# Patient Record
Sex: FEMALE | Race: WHITE | NOT HISPANIC OR LATINO | Employment: FULL TIME | URBAN - METROPOLITAN AREA
[De-identification: names, ages, dates, MRNs, and addresses within clinical notes are randomized per-mention and may not be internally consistent; named-entity substitution may affect disease eponyms.]

---

## 2017-05-08 ENCOUNTER — ALLSCRIPTS OFFICE VISIT (OUTPATIENT)
Dept: OTHER | Facility: OTHER | Age: 52
End: 2017-05-08

## 2017-05-08 DIAGNOSIS — H91.90 HEARING LOSS: ICD-10-CM

## 2017-09-25 ENCOUNTER — TRANSCRIBE ORDERS (OUTPATIENT)
Dept: ADMINISTRATIVE | Facility: HOSPITAL | Age: 52
End: 2017-09-25

## 2017-11-09 ENCOUNTER — ALLSCRIPTS OFFICE VISIT (OUTPATIENT)
Dept: OTHER | Facility: OTHER | Age: 52
End: 2017-11-09

## 2017-11-10 NOTE — PROGRESS NOTES
Assessment    1  Viral URI (465 9) (J06 9,B97 89)   2  Hypertension (401 9) (I10)   3  Never a smoker   4  Need for influenza vaccination (V04 81) (Z23)   5  Encounter for screening colonoscopy (V76 51) (Z12 11)    Plan  Allergic rhinitis    · Nasacort Allergy 24HR 55 MCG/ACT Nasal Aerosol; 2 sen daily  Encounter for screening colonoscopy    · *1 -  [facility]  *  Status:Active - Retrospective By Protocol Authorization  Requested for: 24TIK5470  Care Summary provided  : Yes  Need for influenza vaccination    · Fluzone Quadrivalent Intramuscular Suspension; 0 5ml IM; To Be Done:09Nov2017  Sinusitis    · Cefuroxime Axetil 500 MG Oral Tablet  Viral URI    · Follow Up if Not Better Evaluation and Treatment  Follow-up  Status: Complete  Done:09Nov2017 02:29PM    Discussion/Summary    1 : Viral upper respiratory infection: Recommend symptomatic treatment  She can use Nasacort as well  Continue with the current treatments she is using  Follow-up in approximately 2 weeks if needed  Hypertension: Blood pressure today is slightly elevated, however she is ill  No changes at the moment  Re-evaluate in the future  Continue with hydrochlorothiazide  Chief Complaint  c/o annoying persistent cough and PND  Present about 1 week  Using Mucinex DM and otc cough drops  Also used a ProAir inhaler that she had in the house last night  History of Present Illness  HPI: See chief complaint  Cough has become more aggressive to the point of almost vomiting  She does have some nasal congestion, sore throat, and pain in L ear  No fever, chills, myalgias, fatigue  Sick contacts at work  Allergic to Macrobid  Review of Systems   Constitutional: No fever, no chills, feels well, no tiredness, no recent weight gain or loss  ENT: earache,-- sore throat-- and-- nasal discharge, but-- no hearing loss-- and-- no hoarseness    Cardiovascular: no complaints of slow or fast heart rate, no chest pain, no palpitations, no leg claudication or lower extremity edema  Respiratory: cough-- and-- PND, but-- no shortness of breath,-- no wheezing-- and-- no shortness of breath during exertion  Gastrointestinal: no complaints of abdominal pain, no constipation, no nausea or diarrhea, no vomiting, no bloody stools  Genitourinary: no complaints of dysuria, no incontinence, no pelvic pain, no dysmenorrhea, no vaginal discharge or abnormal vaginal bleeding  Musculoskeletal: no complaints of arthralgia, no myalgia, no joint swelling or stiffness, no limb pain or swelling  Integumentary: no complaints of skin rash or lesion, no itching or dry skin, no skin wounds  Neurological: no complaints of headache, no confusion, no numbness or tingling, no dizziness or fainting  ROS reviewed  Active Problems  1  Acute UTI (599 0) (N39 0)   2  Allergic rhinitis (477 9) (J30 9)   3  Back pain (724 5) (M54 9)   4  Breast lump (611 72) (N63)   5  Ceruminosis (380 4) (H61 20)   6  Cough (786 2) (R05)   7  Decreased hearing (389 9) (H91 90)   8  Dermatitis (692 9) (L30 9)   9  Encounter for screening colonoscopy (V76 51) (Z12 11)   10  Fibrocystic disease of breast (610 1) (N60 19)   11  Hearing loss (389 9) (H91 90)   12  Hyperlipidemia (272 4) (E78 5)   13  Hypertension (401 9) (I10)   14  Hypokalemia (276 8) (E87 6)   15  Neoplasm of skin (239 2) (D49 2)   16  Ptosis, both eyelids (374 30) (H02 403)   17  Sinusitis (473 9) (J32 9)   18  Skin cyst (706 2) (L72 9)   19  Urinary frequency (788 41) (R35 0)   20  Visit for routine gyn exam (V72 31) (Z01 419)    Family History  Mother    1  Family history of Lung Cancer (V16 1)   2  Family history of Mother  At Age 66  Father    3  Family history of Coronary Artery Disease (V17 49)   4  Family history of Father  At Age 62   8  Family history of Hypertension (V17 49)   6  Family history of Myocardial Infarction Arrhythmias  Sister    7   Family history of Hypertension (V17 49)   8  Family history of Hypertension (V17 49)   9  Family history of Hypertension (V17 49)  Brother    8  Family history of Hypertension (V17 49)    Social History     · Never a smoker    Current Meds   1  Cefuroxime Axetil 500 MG Oral Tablet; TAKE 1 TABLET EVERY 12 HOURS DAILY; Therapy: 51FWW0077 to (Last YF:86NVI1949) Ordered   2  HydroCHLOROthiazide 12 5 MG Oral Tablet; TAKE 1 TABLET DAILY (OFFICE VISIT NEEDED BEFORE REFILLS); Therapy: 06BID7514 to (Last Rx:02Oct2017)  Requested for: 47Sam5097 Ordered   3  Nasacort Allergy 24HR 55 MCG/ACT Nasal Aerosol; 2 sen daily; Therapy: 20Apr2012 to (Evaluate:59Khq3948)  Requested for: 73UAX9083; Last Rx:48Qbb7572 Ordered    Allergies  1  Macrobid CAPS   2  ACE Inhibitors   3  Lisinopril TABS   4  Sulfa Drugs    Vitals   Recorded: 58YCR2101 01:57PM   Temperature 97 7 F, Tympanic   Heart Rate 60, L Radial   Pulse Quality Regular, L Radial   Systolic 786, LUE, Sitting   Diastolic 80, LUE, Sitting   Height 5 ft 4 5 in   Weight 137 lb 3 2 oz   BMI Calculated 23 19   BSA Calculated 1 68     Results/Data  PHQ-2 Adult Depression Screening 44NVZ0272 02:01PM User, s     Test Name Result Flag Reference   PHQ-2 Adult Depression Score 0       Over the last two weeks, how often have you been bothered by any of the following problems? Little interest or pleasure in doing things: Not at all - 0 Feeling down, depressed, or hopeless: Not at all - 0   PHQ-2 Adult Depression Screening Negative           Signatures   Electronically signed by :  MARIAELENA Enriquez ; Nov 9 2017  2:32PM EST                       (Author)

## 2017-12-18 ENCOUNTER — APPOINTMENT (OUTPATIENT)
Dept: AUDIOLOGY | Age: 52
End: 2017-12-18
Payer: COMMERCIAL

## 2017-12-18 ENCOUNTER — GENERIC CONVERSION - ENCOUNTER (OUTPATIENT)
Dept: FAMILY MEDICINE CLINIC | Facility: CLINIC | Age: 52
End: 2017-12-18

## 2017-12-18 PROCEDURE — 92557 COMPREHENSIVE HEARING TEST: CPT | Performed by: AUDIOLOGIST

## 2017-12-18 PROCEDURE — 92567 TYMPANOMETRY: CPT | Performed by: AUDIOLOGIST

## 2018-01-04 ENCOUNTER — GENERIC CONVERSION - ENCOUNTER (OUTPATIENT)
Dept: FAMILY MEDICINE CLINIC | Facility: CLINIC | Age: 53
End: 2018-01-04

## 2018-01-12 NOTE — PROGRESS NOTES
Assessment    1  Hypertension (401 9) (I10)   2  Hyperlipidemia (272 4) (E78 5)   3  Encounter for preventive health examination (V70 0) (Z00 00)    Plan  Health Maintenance    · *[facility] Physician Referral  Consult for  colonoscopy  Status: Active  Requested for: 48AHE7213  Care Summary provided  : Yes  Hypertension    · HydroCHLOROthiazide 12 5 MG Oral Tablet; TAKE 1 TABLET DAILY    Discussion/Summary    #1  Hypertension-check CMP  Stable  Meds renewed  #2  Hyperlipidemia-check lipid panel  #3  Allergic rhinitis- stable  #4  health maint- check fasting labs  Order given for colonoscopy  Chief Complaint  Pt  here for annual physical  Discuss b/p medication  Pt  declines flu vaccine today  kck      History of Present Illness  HPI: Pt here today to follow up on her chronic conditions  She has no c/o today  SHe is up to date on her GYN and mammo but has never had a colonoscopy  Her work is very busy and she works long hours  She declines flu today  She wears reading glasses only  She has been only taking 1/2 of her HTN meds HCTZ for over a year now  Review of Systems    Constitutional: No fever, no chills, feels well, no tiredness, no recent weight gain or weight loss  Eyes: No complaints of eye pain, no red eyes, no eyesight problems, no discharge, no dry eyes, no itching of eyes  ENT: no complaints of earache, no loss of hearing, no nose bleeds, no nasal discharge, no sore throat, no hoarseness  Cardiovascular: No complaints of slow heart rate, no fast heart rate, no chest pain, no palpitations, no leg claudication, no lower extremity edema  Respiratory: No complaints of shortness of breath, no wheezing, no cough, no SOB on exertion, no orthopnea, no PND  Gastrointestinal: No complaints of abdominal pain, no constipation, no nausea or vomiting, no diarrhea, no bloody stools     Genitourinary: No complaints of dysuria, no incontinence, no pelvic pain, no dysmenorrhea, no vaginal discharge or bleeding  Musculoskeletal: No complaints of arthralgias, no myalgias, no joint swelling or stiffness, no limb pain or swelling  Integumentary: No complaints of skin rash or lesions, no itching, no skin wounds, no breast pain or lump  Neurological: No complaints of headache, no confusion, no convulsions, no numbness, no dizziness or fainting, no tingling, no limb weakness, no difficulty walking  Psychiatric: Not suicidal, no sleep disturbance, no anxiety or depression, no change in personality, no emotional problems  Endocrine: No complaints of proptosis, no hot flashes, no muscle weakness, no deepening of the voice, no feelings of weakness  Hematologic/Lymphatic: No complaints of swollen glands, no swollen glands in the neck, does not bleed easily, does not bruise easily  ROS reviewed  Active Problems    1  Acute UTI (599 0) (N39 0)   2  Allergic rhinitis (477 9) (J30 9)   3  Back pain (724 5) (M54 9)   4  Breast lump (611 72) (N63)   5  Ceruminosis (380 4) (H61 20)   6  Cough (786 2) (R05)   7  Dermatitis (692 9) (L30 9)   8  Encounter for screening colonoscopy (V76 51) (Z12 11)   9  Fibrocystic disease of breast (610 1) (N60 19)   10  Hearing loss (389 9) (H91 90)   11  Hyperlipidemia (272 4) (E78 5)   12  Hypertension (401 9) (I10)   13  Hypokalemia (276 8) (E87 6)   14  Neoplasm of skin (239 2) (D49 2)   15  Sinusitis (473 9) (J32 9)   16  Skin cyst (706 2) (L72 9)   17  Urinary frequency (788 41) (R35 0)   18   Visit for routine gyn exam (V72 31) (Z01 419)    Family History  Mother    · Family history of Lung Cancer (V16 1)   · Family history of Mother  At Age 66  Father    · Family history of Coronary Artery Disease (V17 49)   · Family history of Father  At Age 64   · Family history of Hypertension (V17 49)   · Family history of Myocardial Infarction Arrhythmias  Sister    · Family history of Hypertension (V17 49)   · Family history of Hypertension (V17 49)   · Family history of Hypertension (V17 49)  Brother    · Family history of Hypertension (V17 49)    Social History    · Never A Smoker    Current Meds   1  HydroCHLOROthiazide 25 MG Oral Tablet; Take 1 tablet daily; Therapy: 25LGD6361 to (Last Geraldene Purl)  Requested for: 68ERZ2742 Ordered   2  Nasacort Allergy 24HR 55 MCG/ACT Nasal Aerosol; 2 sen daily; Therapy: 09Pns6091 to (Evaluate:47Oof8204)  Requested for: 07FFW2870; Last   Rx:82Fjs3057 Ordered    Allergies    1  Macrobid CAPS   2  ACE Inhibitors   3  Lisinopril TABS   4  Sulfa Drugs    Vitals   Recorded: 89OQC1281 69:23SZ   Systolic 110, LUE, Sitting   Diastolic 60, LUE, Sitting   Heart Rate 68   Height 5 ft 4 5 in   Weight 138 lb 6 08 oz   BMI Calculated 23 39   BSA Calculated 1 68     Physical Exam    Constitutional   General appearance: No acute distress, well appearing and well nourished  Head and Face   Head and face: Normal     Eyes   Conjunctiva and lids: No swelling, erythema or discharge  Pupils and irises: Equal, round, reactive to light  Ears, Nose, Mouth, and Throat   External inspection of ears and nose: Normal     Otoscopic examination: Tympanic membranes translucent with normal light reflex  Canals patent without erythema  Hearing: Normal     Nasal mucosa, septum, and turbinates: Normal without edema or erythema  Lips, teeth, and gums: Normal, good dentition  Oropharynx: Normal with no erythema, edema, exudate or lesions  Neck   Neck: Supple, symmetric, trachea midline, no masses  Thyroid: Normal, no thyromegaly  Pulmonary   Respiratory effort: No increased work of breathing or signs of respiratory distress  Auscultation of lungs: Clear to auscultation  Cardiovascular   Auscultation of heart: Normal rate and rhythm, normal S1 and S2, no murmurs  Carotid pulses: 2+ bilaterally      Examination of extremities for edema and/or varicosities: Normal     Lymphatic   Palpation of lymph nodes in neck: No lymphadenopathy  Musculoskeletal   Gait and station: Normal     Neurologic   Coordination: Normal finger to nose and heel to shin  Psychiatric   Judgment and insight: Normal     Mood and affect: Normal        Health Management  Encounter for screening colonoscopy   COLONOSCOPY; every 10 years; Next Due: 96KDD0783;  Overdue    Signatures   Electronically signed by : Juliana Avila [facility]; Oct 24 2016 12:07PM EST                       (Author)    Electronically signed by : MARIAELENA Downey ; Oct 24 2016  8:28PM EST

## 2018-01-12 NOTE — PROGRESS NOTES
Assessment    1  Sinusitis (473 9) (J32 9)   2  Cough (786 2) (R05)    Discussion/Summary    #1  Sinusitis-patient given prescription for Zithromax 500 mg one daily #3, Coricidin HBP/cold and flu  #2  Cough-she was told to  Delsym 2 teaspoons twice a day  She was also given a sample of Breo one puff daily  Possible side effects of new medications were reviewed with the patient/guardian today  The treatment plan was reviewed with the patient/guardian  The patient/guardian understands and agrees with the treatment plan      Chief Complaint  productive cough x 5 days - not able to sleep at night  - [facility]      History of Present Illness  HPI: This is a 49-year-old female who comes in with a productive cough for the past 5 days  Cough does keep her awake at night  She has an excessive amount of postnasal drip  She does not have any head congestion but has some sinus pressure  She does not have any chest congestion  She denies any nausea, vomiting or diarrhea  She has not taken any medication for this problem and she cannot take decongestions  Her blood pressure is 120/82 and her temperature is 99 1  Review of Systems    Constitutional: feeling poorly, but as noted in HPI, no fever, no chills and not feeling tired  ENT: Postnasal drip, but as noted in HPI, no earache, no nosebleeds, no sore throat, no hearing loss, no nasal discharge and no hoarseness  Cardiovascular: no complaints of slow or fast heart rate, no chest pain, no palpitations, no leg claudication or lower extremity edema  Respiratory: as noted in HPI, no shortness of breath, no orthopnea, no wheezing, no shortness of breath during exertion and no PND    The patient presents with complaints of gradual onset of intermittent episodes of moderate cough, described as loose and productive  Gastrointestinal: no complaints of abdominal pain, no constipation, no nausea or diarrhea, no vomiting, no bloody stools     Genitourinary: no complaints of dysuria, no incontinence, no pelvic pain, no dysmenorrhea, no vaginal discharge or abnormal vaginal bleeding  Active Problems    1  Acute UTI (599 0) (N39 0)   2  Allergic rhinitis (477 9) (J30 9)   3  Back pain (724 5) (M54 9)   4  Breast lump (611 72) (N63)   5  Ceruminosis (380 4) (H61 20)   6  Dermatitis (692 9) (L30 9)   7  Hearing loss (389 9) (H91 90)   8  Hyperlipidemia (272 4) (E78 5)   9  Hypertension (401 9) (I10)   10  Hypokalemia (276 8) (E87 6)   11  Neoplasm of skin (239 2) (D49 2)   12  Skin cyst (706 2) (L72 9)   13  Visit for routine gyn exam () (Z01 419)    Family History    1  Family history of Lung Cancer (V16 1)   2  Family history of Mother  At Age 66    3  Family history of Coronary Artery Disease (V17 49)   4  Family history of Father  At Age 62   8  Family history of Hypertension (V17 49)   6  Family history of Myocardial Infarction Arrhythmias    7  Family history of Hypertension (V17 49)   8  Family history of Hypertension (V17 49)   9  Family history of Hypertension (V17 49)    10  Family history of Hypertension (V17 49)    Social History    · Never A Smoker    Current Meds   1  Hydrochlorothiazide 25 MG Oral Tablet; Take 1 tablet daily; Therapy: 89IVX8319 to (Last Amelie Alberto)  Requested for: 23TNQ7799 Ordered   2  Nasacort Allergy 24HR 55 MCG/ACT Nasal Aerosol; 2 sen daily; Therapy: 68Thz7838 to (Evaluate:13Oau3856)  Requested for: 65WIJ4921; Last   Rx:59Tbk1147 Ordered    Allergies    1  Macrobid CAPS   2  ACE Inhibitors   3  Lisinopril TABS   4  Sulfa Drugs    Vitals   Recorded: 98IAM1194 11:31AM   Temperature 99 1 F, Oral   Heart Rate 64, L Radial   Pulse Quality Regular   Systolic 822, LUE, Sitting   Diastolic 82, LUE, Sitting   Height 5 ft 4 5 in   Weight 135 lb 2 08 oz   BMI Calculated 22 84   BSA Calculated 1 66     Physical Exam    Constitutional   General appearance: No acute distress, well appearing and well nourished      Ears, Nose, Mouth, and Throat   External inspection of ears and nose: Normal     Otoscopic examination: Tympanic membranes translucent with normal light reflex  Canals patent without erythema  Oropharynx: Abnormal   Positive postnasal drip, no ear edema of posterior pharynx no exudates  Pulmonary   Auscultation of lungs: Clear to auscultation  Cardiovascular   Auscultation of heart: Normal rate and rhythm, normal S1 and S2, without murmurs  Examination of extremities for edema and/or varicosities: Normal     Lymphatic   Palpation of lymph nodes in neck: No lymphadenopathy  Signatures   Electronically signed by : Piotr Bang, [facility]; Ravin 15 2016 11:51AM EST                       (Author)    Electronically signed by :  MARIAELENA Monae ; Ravin 15 2016  2:03PM EST

## 2018-01-13 VITALS
HEIGHT: 65 IN | BODY MASS INDEX: 22.86 KG/M2 | WEIGHT: 137.2 LBS | DIASTOLIC BLOOD PRESSURE: 80 MMHG | HEART RATE: 60 BPM | TEMPERATURE: 97.7 F | SYSTOLIC BLOOD PRESSURE: 142 MMHG

## 2018-01-15 VITALS
BODY MASS INDEX: 23.18 KG/M2 | WEIGHT: 139.13 LBS | HEIGHT: 65 IN | SYSTOLIC BLOOD PRESSURE: 130 MMHG | TEMPERATURE: 97.5 F | HEART RATE: 64 BPM | DIASTOLIC BLOOD PRESSURE: 70 MMHG

## 2018-03-31 DIAGNOSIS — I10 ESSENTIAL HYPERTENSION: Primary | ICD-10-CM

## 2018-04-01 RX ORDER — HYDROCHLOROTHIAZIDE 12.5 MG/1
TABLET ORAL
Qty: 90 TABLET | Refills: 0 | Status: SHIPPED | OUTPATIENT
Start: 2018-04-01 | End: 2018-07-02 | Stop reason: SDUPTHER

## 2018-07-02 ENCOUNTER — TELEPHONE (OUTPATIENT)
Dept: FAMILY MEDICINE CLINIC | Facility: CLINIC | Age: 53
End: 2018-07-02

## 2018-07-02 DIAGNOSIS — I10 ESSENTIAL HYPERTENSION: ICD-10-CM

## 2018-07-02 DIAGNOSIS — I10 ESSENTIAL HYPERTENSION: Primary | ICD-10-CM

## 2018-07-02 DIAGNOSIS — E78.2 MIXED HYPERLIPIDEMIA: ICD-10-CM

## 2018-07-02 DIAGNOSIS — M79.10 MYALGIA: ICD-10-CM

## 2018-07-02 PROBLEM — H02.403 PTOSIS, BOTH EYELIDS: Status: ACTIVE | Noted: 2017-05-08

## 2018-07-02 PROBLEM — Z00.00 ROUTINE MEDICAL EXAM: Status: ACTIVE | Noted: 2018-05-16

## 2018-07-02 PROBLEM — H91.91 HEARING LOSS OF RIGHT EAR: Status: ACTIVE | Noted: 2018-05-16

## 2018-07-02 PROBLEM — J30.1 CHRONIC SEASONAL ALLERGIC RHINITIS DUE TO POLLEN: Status: ACTIVE | Noted: 2018-05-16

## 2018-07-02 PROBLEM — D32.9 MENINGIOMA (HCC): Status: ACTIVE | Noted: 2018-05-16

## 2018-07-02 PROBLEM — H43.813 VITREOUS DETACHMENT OF BOTH EYES: Status: ACTIVE | Noted: 2018-05-16

## 2018-07-02 PROBLEM — H25.9 AGE-RELATED CATARACT: Status: ACTIVE | Noted: 2018-06-19

## 2018-07-02 RX ORDER — HYDROCHLOROTHIAZIDE 12.5 MG/1
TABLET ORAL
Qty: 90 TABLET | Refills: 0 | Status: SHIPPED | OUTPATIENT
Start: 2018-07-02 | End: 2018-09-30 | Stop reason: SDUPTHER

## 2018-07-02 NOTE — TELEPHONE ENCOUNTER
LMOM for pt  To call and schedule and OV and that she needs labs and an OV before any more refills can be given  Did you want to order blood work for pt  To have done before OV ?

## 2018-09-30 DIAGNOSIS — I10 ESSENTIAL HYPERTENSION: ICD-10-CM

## 2018-10-01 RX ORDER — HYDROCHLOROTHIAZIDE 12.5 MG/1
TABLET ORAL
Qty: 90 TABLET | Refills: 0 | Status: SHIPPED | OUTPATIENT
Start: 2018-10-01 | End: 2018-12-30 | Stop reason: SDUPTHER

## 2018-12-30 DIAGNOSIS — I10 ESSENTIAL HYPERTENSION: ICD-10-CM

## 2018-12-30 RX ORDER — HYDROCHLOROTHIAZIDE 12.5 MG/1
TABLET ORAL
Qty: 90 TABLET | Refills: 0 | Status: SHIPPED | OUTPATIENT
Start: 2018-12-30 | End: 2019-03-31 | Stop reason: SDUPTHER

## 2019-03-31 DIAGNOSIS — I10 ESSENTIAL HYPERTENSION: ICD-10-CM

## 2019-04-01 RX ORDER — HYDROCHLOROTHIAZIDE 12.5 MG/1
TABLET ORAL
Qty: 90 TABLET | Refills: 0 | Status: SHIPPED | OUTPATIENT
Start: 2019-04-01 | End: 2019-05-09

## 2019-07-01 DIAGNOSIS — I10 ESSENTIAL HYPERTENSION: ICD-10-CM

## 2019-07-01 RX ORDER — HYDROCHLOROTHIAZIDE 12.5 MG/1
TABLET ORAL
Qty: 90 TABLET | Refills: 0 | Status: SHIPPED | OUTPATIENT
Start: 2019-07-01

## 2019-09-29 DIAGNOSIS — I10 ESSENTIAL HYPERTENSION: ICD-10-CM

## 2019-09-30 RX ORDER — HYDROCHLOROTHIAZIDE 12.5 MG/1
TABLET ORAL
Qty: 90 TABLET | Refills: 4 | OUTPATIENT
Start: 2019-09-30

## 2019-09-30 NOTE — TELEPHONE ENCOUNTER
Please call pt  She has not been seen in our office since 2017  No refills  Needs apt  Sorry  She was told same thing in April

## 2021-04-12 DIAGNOSIS — Z23 ENCOUNTER FOR IMMUNIZATION: ICD-10-CM

## 2024-02-21 PROBLEM — Z00.00 ROUTINE MEDICAL EXAM: Status: RESOLVED | Noted: 2018-05-16 | Resolved: 2024-02-21

## 2024-10-04 ENCOUNTER — TRANSCRIBE ORDERS (OUTPATIENT)
Facility: HOSPITAL | Age: 59
End: 2024-10-04

## 2024-10-04 DIAGNOSIS — Z00.6 ENCOUNTER FOR EXAMINATION FOR NORMAL COMPARISON OR CONTROL IN CLINICAL RESEARCH PROGRAM: Primary | ICD-10-CM
